# Patient Record
Sex: MALE | Race: ASIAN | NOT HISPANIC OR LATINO | URBAN - METROPOLITAN AREA
[De-identification: names, ages, dates, MRNs, and addresses within clinical notes are randomized per-mention and may not be internally consistent; named-entity substitution may affect disease eponyms.]

---

## 2017-05-12 ENCOUNTER — EMERGENCY (EMERGENCY)
Facility: HOSPITAL | Age: 57
LOS: 1 days | Discharge: HOME | End: 2017-05-12
Attending: EMERGENCY MEDICINE
Payer: COMMERCIAL

## 2017-05-12 VITALS
HEIGHT: 65 IN | DIASTOLIC BLOOD PRESSURE: 80 MMHG | HEART RATE: 82 BPM | SYSTOLIC BLOOD PRESSURE: 178 MMHG | WEIGHT: 164.91 LBS | TEMPERATURE: 98 F | RESPIRATION RATE: 16 BRPM | OXYGEN SATURATION: 98 %

## 2017-05-12 VITALS
SYSTOLIC BLOOD PRESSURE: 145 MMHG | RESPIRATION RATE: 19 BRPM | OXYGEN SATURATION: 99 % | DIASTOLIC BLOOD PRESSURE: 90 MMHG | HEART RATE: 77 BPM | TEMPERATURE: 97 F

## 2017-05-12 DIAGNOSIS — R04.0 EPISTAXIS: ICD-10-CM

## 2017-05-12 PROCEDURE — 99284 EMERGENCY DEPT VISIT MOD MDM: CPT | Mod: 25

## 2017-05-12 PROCEDURE — 99284 EMERGENCY DEPT VISIT MOD MDM: CPT

## 2017-05-12 NOTE — ED PROVIDER NOTE - NS ED MD SCRIBE ATTENDING SCRIBE SECTIONS
VITAL SIGNS( Pullset)/PAST MEDICAL/SURGICAL/SOCIAL HISTORY/REVIEW OF SYSTEMS/HISTORY OF PRESENT ILLNESS/PHYSICAL EXAM/HIV

## 2017-05-12 NOTE — ED PROVIDER NOTE - PROGRESS NOTE DETAILS
D/w Dr Dwyer, covering ENT doc for Dr Cade (surgeon that performed procedure). Agree w not placing any packing, observe and DC w ENT f/u in the office at 9am. Pt agree w plan, will monitor, BP-140/80 Feels better, bleeding subside, min blood on dressing, no bleeding in the posterior pharynx. VS wnl. Will DC w immediate ENT f/u in the office

## 2017-05-12 NOTE — ED PROVIDER NOTE - ENMT, MLM
No active bleeding, moderate sized clot in the posterior pharynx. Prosthetic device noticed in L nostril.

## 2017-05-12 NOTE — ED PROVIDER NOTE - MEDICAL DECISION MAKING DETAILS
55yo M on no anticoagulation in the ER after nasal surgery for deviated septum and chr sinusitis. Mild oozing anteriorly and in the post pharynx. Prosthesis note on both aspects of the septum. VS wnl, pink conjunctivae, no signs of anemia/significant bleeding. Will observe, contact ENT, reevaluate

## 2017-05-12 NOTE — ED ADULT NURSE NOTE - OBJECTIVE STATEMENT
As per pt, "I have an endoscopic procedure yesterday and I feel like it's bleeding more than usual." Denies difficulty breathing.

## 2017-05-12 NOTE — ED PROVIDER NOTE - OBJECTIVE STATEMENT
55 y/o M pt w/ no significant PMHx presents to ED c/o epistaxis today. Pt states that he just had endoscopic sinus surgery at [facility] yesterday for a deviated septum. Pt states that today morning he began to feel as if blood is going down the back of his throat. Pt denies fever, chills, or any other complaints. NKDA.
